# Patient Record
Sex: FEMALE | Race: WHITE | NOT HISPANIC OR LATINO | URBAN - METROPOLITAN AREA
[De-identification: names, ages, dates, MRNs, and addresses within clinical notes are randomized per-mention and may not be internally consistent; named-entity substitution may affect disease eponyms.]

---

## 2017-04-20 ENCOUNTER — OUTPATIENT (OUTPATIENT)
Dept: OUTPATIENT SERVICES | Facility: HOSPITAL | Age: 67
LOS: 1 days | End: 2017-04-20
Payer: MEDICARE

## 2017-04-20 ENCOUNTER — APPOINTMENT (OUTPATIENT)
Dept: MAMMOGRAPHY | Facility: HOSPITAL | Age: 67
End: 2017-04-20

## 2017-04-20 ENCOUNTER — APPOINTMENT (OUTPATIENT)
Dept: RADIOLOGY | Facility: HOSPITAL | Age: 67
End: 2017-04-20

## 2017-04-20 DIAGNOSIS — Z78.0 ASYMPTOMATIC MENOPAUSAL STATE: ICD-10-CM

## 2017-04-20 DIAGNOSIS — Z12.31 ENCOUNTER FOR SCREENING MAMMOGRAM FOR MALIGNANT NEOPLASM OF BREAST: ICD-10-CM

## 2017-04-20 PROCEDURE — 77080 DXA BONE DENSITY AXIAL: CPT

## 2017-04-20 PROCEDURE — 77063 BREAST TOMOSYNTHESIS BI: CPT

## 2017-04-20 PROCEDURE — 77067 SCR MAMMO BI INCL CAD: CPT

## 2018-05-29 ENCOUNTER — OUTPATIENT (OUTPATIENT)
Dept: OUTPATIENT SERVICES | Facility: HOSPITAL | Age: 68
LOS: 1 days | End: 2018-05-29
Payer: MEDICARE

## 2018-05-29 ENCOUNTER — APPOINTMENT (OUTPATIENT)
Dept: MAMMOGRAPHY | Facility: HOSPITAL | Age: 68
End: 2018-05-29
Payer: MEDICARE

## 2018-05-29 DIAGNOSIS — Z12.31 ENCOUNTER FOR SCREENING MAMMOGRAM FOR MALIGNANT NEOPLASM OF BREAST: ICD-10-CM

## 2018-05-29 DIAGNOSIS — Z00.8 ENCOUNTER FOR OTHER GENERAL EXAMINATION: ICD-10-CM

## 2018-05-29 PROCEDURE — 77067 SCR MAMMO BI INCL CAD: CPT

## 2018-05-29 PROCEDURE — 77063 BREAST TOMOSYNTHESIS BI: CPT

## 2018-05-29 PROCEDURE — 77067 SCR MAMMO BI INCL CAD: CPT | Mod: 26

## 2018-05-29 PROCEDURE — 77063 BREAST TOMOSYNTHESIS BI: CPT | Mod: 26

## 2019-10-29 ENCOUNTER — APPOINTMENT (OUTPATIENT)
Dept: MAMMOGRAPHY | Facility: HOSPITAL | Age: 69
End: 2019-10-29

## 2019-10-29 ENCOUNTER — OUTPATIENT (OUTPATIENT)
Dept: OUTPATIENT SERVICES | Facility: HOSPITAL | Age: 69
LOS: 1 days | End: 2019-10-29
Payer: MEDICARE

## 2019-10-29 DIAGNOSIS — Z12.31 ENCOUNTER FOR SCREENING MAMMOGRAM FOR MALIGNANT NEOPLASM OF BREAST: ICD-10-CM

## 2019-10-29 PROCEDURE — 77063 BREAST TOMOSYNTHESIS BI: CPT | Mod: 26

## 2019-10-29 PROCEDURE — 77067 SCR MAMMO BI INCL CAD: CPT | Mod: 26

## 2019-10-29 PROCEDURE — 77067 SCR MAMMO BI INCL CAD: CPT

## 2019-10-29 PROCEDURE — 77063 BREAST TOMOSYNTHESIS BI: CPT

## 2020-10-30 ENCOUNTER — APPOINTMENT (OUTPATIENT)
Dept: RADIOLOGY | Facility: HOSPITAL | Age: 70
End: 2020-10-30
Payer: MEDICARE

## 2020-10-30 ENCOUNTER — APPOINTMENT (OUTPATIENT)
Dept: MAMMOGRAPHY | Facility: HOSPITAL | Age: 70
End: 2020-10-30
Payer: MEDICARE

## 2020-10-30 ENCOUNTER — OUTPATIENT (OUTPATIENT)
Dept: OUTPATIENT SERVICES | Facility: HOSPITAL | Age: 70
LOS: 1 days | End: 2020-10-30
Payer: MEDICARE

## 2020-10-30 DIAGNOSIS — Z00.8 ENCOUNTER FOR OTHER GENERAL EXAMINATION: ICD-10-CM

## 2020-10-30 PROCEDURE — 77063 BREAST TOMOSYNTHESIS BI: CPT | Mod: 26

## 2020-10-30 PROCEDURE — 77067 SCR MAMMO BI INCL CAD: CPT

## 2020-10-30 PROCEDURE — 77080 DXA BONE DENSITY AXIAL: CPT | Mod: 26

## 2020-10-30 PROCEDURE — 77067 SCR MAMMO BI INCL CAD: CPT | Mod: 26

## 2020-10-30 PROCEDURE — 77080 DXA BONE DENSITY AXIAL: CPT

## 2020-10-30 PROCEDURE — 77063 BREAST TOMOSYNTHESIS BI: CPT

## 2021-06-07 ENCOUNTER — OUTPATIENT (OUTPATIENT)
Dept: OUTPATIENT SERVICES | Facility: HOSPITAL | Age: 71
LOS: 1 days | End: 2021-06-07
Payer: MEDICARE

## 2021-06-07 ENCOUNTER — APPOINTMENT (OUTPATIENT)
Dept: ULTRASOUND IMAGING | Facility: HOSPITAL | Age: 71
End: 2021-06-07
Payer: MEDICARE

## 2021-06-07 DIAGNOSIS — Z00.8 ENCOUNTER FOR OTHER GENERAL EXAMINATION: ICD-10-CM

## 2021-06-07 PROCEDURE — 76641 ULTRASOUND BREAST COMPLETE: CPT

## 2021-06-07 PROCEDURE — 76641 ULTRASOUND BREAST COMPLETE: CPT | Mod: 26,50

## 2021-06-21 ENCOUNTER — TRANSCRIPTION ENCOUNTER (OUTPATIENT)
Age: 71
End: 2021-06-21

## 2022-01-25 ENCOUNTER — NON-APPOINTMENT (OUTPATIENT)
Age: 72
End: 2022-01-25

## 2022-01-25 ENCOUNTER — APPOINTMENT (OUTPATIENT)
Dept: INTERNAL MEDICINE | Facility: CLINIC | Age: 72
End: 2022-01-25
Payer: MEDICARE

## 2022-01-25 VITALS
WEIGHT: 147 LBS | SYSTOLIC BLOOD PRESSURE: 130 MMHG | BODY MASS INDEX: 27.05 KG/M2 | HEART RATE: 76 BPM | RESPIRATION RATE: 16 BRPM | OXYGEN SATURATION: 98 % | TEMPERATURE: 97.5 F | HEIGHT: 62 IN | DIASTOLIC BLOOD PRESSURE: 74 MMHG

## 2022-01-25 DIAGNOSIS — Z86.79 PERSONAL HISTORY OF OTHER DISEASES OF THE CIRCULATORY SYSTEM: ICD-10-CM

## 2022-01-25 DIAGNOSIS — Z78.9 OTHER SPECIFIED HEALTH STATUS: ICD-10-CM

## 2022-01-25 DIAGNOSIS — L93.2 OTHER LOCAL LUPUS ERYTHEMATOSUS: ICD-10-CM

## 2022-01-25 PROCEDURE — 99204 OFFICE O/P NEW MOD 45 MIN: CPT

## 2022-01-25 RX ORDER — HYDROXYCHLOROQUINE SULFATE 200 MG/1
200 TABLET ORAL
Refills: 0 | Status: ACTIVE | COMMUNITY

## 2022-01-25 NOTE — HISTORY OF PRESENT ILLNESS
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  Dr. Stevens.\par History of HTN- stable on Meds.

## 2022-03-16 ENCOUNTER — EMERGENCY (EMERGENCY)
Facility: HOSPITAL | Age: 72
LOS: 1 days | Discharge: HOME | End: 2022-03-16
Attending: EMERGENCY MEDICINE | Admitting: EMERGENCY MEDICINE
Payer: MEDICARE

## 2022-03-16 VITALS
HEART RATE: 82 BPM | OXYGEN SATURATION: 99 % | RESPIRATION RATE: 18 BRPM | SYSTOLIC BLOOD PRESSURE: 133 MMHG | DIASTOLIC BLOOD PRESSURE: 81 MMHG

## 2022-03-16 VITALS
WEIGHT: 143.96 LBS | RESPIRATION RATE: 18 BRPM | SYSTOLIC BLOOD PRESSURE: 154 MMHG | OXYGEN SATURATION: 100 % | DIASTOLIC BLOOD PRESSURE: 85 MMHG | TEMPERATURE: 98 F | HEIGHT: 63 IN | HEART RATE: 89 BPM

## 2022-03-16 LAB
APPEARANCE UR: ABNORMAL
BILIRUB UR-MCNC: NEGATIVE — SIGNIFICANT CHANGE UP
COLOR SPEC: ABNORMAL
DIFF PNL FLD: ABNORMAL
GLUCOSE UR QL: NEGATIVE — SIGNIFICANT CHANGE UP
KETONES UR-MCNC: ABNORMAL
LEUKOCYTE ESTERASE UR-ACNC: ABNORMAL
NITRITE UR-MCNC: NEGATIVE — SIGNIFICANT CHANGE UP
PH UR: 6.5 — SIGNIFICANT CHANGE UP (ref 5–8)
PROT UR-MCNC: 100
SP GR SPEC: 1.01 — SIGNIFICANT CHANGE UP (ref 1.01–1.02)
UROBILINOGEN FLD QL: NEGATIVE — SIGNIFICANT CHANGE UP

## 2022-03-16 PROCEDURE — 99283 EMERGENCY DEPT VISIT LOW MDM: CPT

## 2022-03-16 PROCEDURE — 87086 URINE CULTURE/COLONY COUNT: CPT

## 2022-03-16 PROCEDURE — 87186 SC STD MICRODIL/AGAR DIL: CPT

## 2022-03-16 PROCEDURE — 81001 URINALYSIS AUTO W/SCOPE: CPT

## 2022-03-16 RX ORDER — PHENAZOPYRIDINE HCL 100 MG
100 TABLET ORAL ONCE
Refills: 0 | Status: COMPLETED | OUTPATIENT
Start: 2022-03-16 | End: 2022-03-16

## 2022-03-16 RX ORDER — PHENAZOPYRIDINE HCL 100 MG
1 TABLET ORAL
Qty: 6 | Refills: 0
Start: 2022-03-16 | End: 2022-03-17

## 2022-03-16 RX ORDER — CEFUROXIME AXETIL 250 MG
1 TABLET ORAL
Qty: 10 | Refills: 0
Start: 2022-03-16 | End: 2022-03-20

## 2022-03-16 RX ORDER — CEFUROXIME AXETIL 250 MG
500 TABLET ORAL ONCE
Refills: 0 | Status: COMPLETED | OUTPATIENT
Start: 2022-03-16 | End: 2022-03-16

## 2022-03-16 RX ADMIN — Medication 500 MILLIGRAM(S): at 01:09

## 2022-03-16 RX ADMIN — Medication 100 MILLIGRAM(S): at 01:09

## 2022-03-16 NOTE — ED PROVIDER NOTE - OBJECTIVE STATEMENT
pt c/o dysuria, moderate, x last 3 hrs assoc c hematuria and urinary frequency. no abd or flank pain. no fever/chills.

## 2022-03-16 NOTE — ED PROVIDER NOTE - NSFOLLOWUPINSTRUCTIONS_ED_ALL_ED_FT
- take cefuroxime antibiotic as directed  - take phenazopyridine for urinary pain. it will temporarily turn body fluids red/orange color    Follow Up in 1-3 Days with your own doctor or with  [address] [address]  Phone: [phone]      Urinary Tract Infection in Women    WHAT YOU NEED TO KNOW:    A urinary tract infection (UTI) is caused by bacteria that get inside your urinary tract. Most bacteria that enter your urinary tract come out when you urinate. If the bacteria stay in your urinary tract, you may get an infection. Your urinary tract includes your kidneys, ureters, bladder, and urethra. Urine is made in your kidneys, and it flows from the ureters to the bladder. Urine leaves the bladder through the urethra. A UTI is more common in your lower urinary tract, which includes your bladder and urethra. Female Urinary System         DISCHARGE INSTRUCTIONS:    Return to the emergency department if:     You are urinating very little or not at all.    You have a high fever with shaking chills.     You have side or back pain that gets worse.    Call your doctor if:     You have a fever.    You do not feel better after 2 days of taking antibiotics.    You are vomiting.     You have questions or concerns about your condition or care.    Medicines:     Antibiotics help fight a bacterial infection. If you have UTIs often (called recurrent UTIs), you may be given antibiotics to take regularly. You will be given directions for when and how to use antibiotics. The goal is to prevent UTIs but not cause antibiotic resistance by using antibiotics too often.    Medicines may be given to decrease pain and burning when you urinate. They will also help decrease the feeling that you need to urinate often. These medicines will make your urine orange or red.    Take your medicine as directed. Contact your healthcare provider if you think your medicine is not helping or if you have side effects. Tell him or her if you are allergic to any medicine. Keep a list of the medicines, vitamins, and herbs you take. Include the amounts, and when and why you take them. Bring the list or the pill bottles to follow-up visits. Carry your medicine list with you in case of an emergency.    Follow up with your healthcare provider as directed: Write down your questions so you remember to ask them during your visits.     Prevent another UTI:     Empty your bladder often. Urinate and empty your bladder as soon as you feel the need. Do not hold your urine for long periods of time.    Wipe from front to back after you urinate or have a bowel movement. This will help prevent germs from getting into your urinary tract through your urethra.    Drink liquids as directed. Ask how much liquid to drink each day and which liquids are best for you. You may need to drink more liquids than usual to help flush out the bacteria. Do not drink alcohol, caffeine, or citrus juices. These can irritate your bladder and increase your symptoms. Your healthcare provider may recommend cranberry juice to help prevent a UTI.    Urinate after you have sex. This can help flush out bacteria passed during sex.    Do not douche or use feminine deodorants. These can change the chemical balance in your vagina.      Change sanitary pads or tampons often. This will help prevent germs from getting into your urinary tract.     Talk to your healthcare provider about your birth control method. You may need to change your method if it is increasing your risk for UTIs.    Wear cotton underwear and clothes that are loose. Tight pants and nylon underwear can trap moisture and cause bacteria to grow.    Vaginal estrogen may be recommended. This medicine helps prevent UTIs in women who have gone through menopause or are in oni-menopause.    Do pelvic muscle exercises often. Pelvic muscle exercises may help you start and stop urinating. Strong pelvic muscles may help you empty your bladder easier. Squeeze these muscles tightly for 5 seconds like you are trying to hold back urine. Then relax for 5 seconds. Gradually work up to squeezing for 10 seconds. Do 3 sets of 15 repetitions a day, or as directed.
normal...

## 2022-03-16 NOTE — ED PROVIDER NOTE - NSFOLLOWUPCLINICS_GEN_ALL_ED_FT
[facility]  Family Medicine  [address] [place] [id number]  Phone: [phone]  Fax:   Follow Up Time: 1-3 Days

## 2022-03-16 NOTE — ED PROVIDER NOTE - CLINICAL SUMMARY MEDICAL DECISION MAKING FREE TEXT BOX
pt c dysuria, frequency and hematuria tonight. c/w uti. otherwise well appearing . no f/c. no abd/flank pain to suggest upper uti.  will send ua/ucx. treat c ceftin/pyridium. pmd fu

## 2022-03-16 NOTE — ED ADULT TRIAGE NOTE - CHIEF COMPLAINT QUOTE
Pt states "most severe UTI I've ever had." Pt reports hematuria, urinary frequency, painful urination 8/10 with burning. Denies fever, chills, chest pain, shortness of breath, N/V/D. Pt states she has a hx of UTIs, last UTI was 10/2021.

## 2022-03-16 NOTE — ED PROVIDER NOTE - PATIENT PORTAL LINK FT
You can access the FollowMyHealth Patient Portal offered by [facility] by registering at the following website: [email or web address]. By joining [employer]’s FollowMyHealth portal, you will also be able to view your health information using other applications (apps) compatible with our system.

## 2022-03-19 NOTE — CHART NOTE - NSCHARTNOTEFT_GEN_A_CORE
SW placed call to patient to discuss and assist with follow up care.  Patient presented to ED on 3/15 with complaints of UTI. SW spoke with Pt who states she is feeling better. Pt states she did not yet schedule PCP follow-up, but does have a previously scheduled appointment for 6 weeks from now. SW offered to schedule earlier F/U appointment, Pt declined stating she has a busy schedule and would prefer to schedule independently. Pt declined further assistance. SW encouraged Pt to call should further needs arise.

## 2022-04-15 ENCOUNTER — NON-APPOINTMENT (OUTPATIENT)
Age: 72
End: 2022-04-15

## 2022-06-09 ENCOUNTER — APPOINTMENT (OUTPATIENT)
Dept: INTERNAL MEDICINE | Facility: CLINIC | Age: 72
End: 2022-06-09
Payer: MEDICARE

## 2022-06-09 ENCOUNTER — NON-APPOINTMENT (OUTPATIENT)
Age: 72
End: 2022-06-09

## 2022-06-09 VITALS
TEMPERATURE: 97.3 F | SYSTOLIC BLOOD PRESSURE: 138 MMHG | OXYGEN SATURATION: 97 % | DIASTOLIC BLOOD PRESSURE: 76 MMHG | BODY MASS INDEX: 27.11 KG/M2 | HEART RATE: 69 BPM | WEIGHT: 153 LBS | HEIGHT: 63 IN | RESPIRATION RATE: 16 BRPM

## 2022-06-09 VITALS
HEIGHT: 63 IN | OXYGEN SATURATION: 99 % | TEMPERATURE: 97.7 F | SYSTOLIC BLOOD PRESSURE: 138 MMHG | DIASTOLIC BLOOD PRESSURE: 76 MMHG | BODY MASS INDEX: 27.11 KG/M2 | WEIGHT: 153 LBS | RESPIRATION RATE: 16 BRPM | HEART RATE: 86 BPM

## 2022-06-09 DIAGNOSIS — L93.2 OTHER LOCAL LUPUS ERYTHEMATOSUS: ICD-10-CM

## 2022-06-09 DIAGNOSIS — R92.2 INCONCLUSIVE MAMMOGRAM: ICD-10-CM

## 2022-06-09 DIAGNOSIS — B35.9 DERMATOPHYTOSIS, UNSPECIFIED: ICD-10-CM

## 2022-06-09 PROBLEM — I10 ESSENTIAL (PRIMARY) HYPERTENSION: Chronic | Status: ACTIVE | Noted: 2022-03-16

## 2022-06-09 PROBLEM — M32.9 SYSTEMIC LUPUS ERYTHEMATOSUS, UNSPECIFIED: Chronic | Status: ACTIVE | Noted: 2022-03-16

## 2022-06-09 PROCEDURE — 93000 ELECTROCARDIOGRAM COMPLETE: CPT

## 2022-06-09 PROCEDURE — G0439: CPT

## 2022-06-09 RX ORDER — KETOCONAZOLE 20.5 MG/ML
2 SHAMPOO, SUSPENSION TOPICAL
Qty: 120 | Refills: 1 | Status: ACTIVE | COMMUNITY
Start: 2022-06-09 | End: 1900-01-01

## 2022-06-09 NOTE — HISTORY OF PRESENT ILLNESS
[de-identified] : Pt here today for CPE\par History of cutaneous lupus- followed by rheum . Dr. Stevens. \par History of htn- stable on meds

## 2022-06-09 NOTE — ASSESSMENT
[FreeTextEntry1] : HTN- good control\par Cutaneous lupus- follow up with rheum\par Tinea in back - ketoconazole shampoo TIW to derm.\par HCM- needs gyn, colon. mammogram and derm visit\par check labs and ekg today

## 2022-06-09 NOTE — PHYSICAL EXAM
[No Acute Distress] : no acute distress [Well Nourished] : well nourished [Well Developed] : well developed [Well-Appearing] : well-appearing [Normal Sclera/Conjunctiva] : normal sclera/conjunctiva [PERRL] : pupils equal round and reactive to light [EOMI] : extraocular movements intact [Normal Outer Ear/Nose] : the outer ears and nose were normal in appearance [Normal Oropharynx] : the oropharynx was normal [No JVD] : no jugular venous distention [No Lymphadenopathy] : no lymphadenopathy [Supple] : supple [Thyroid Normal, No Nodules] : the thyroid was normal and there were no nodules present [No Respiratory Distress] : no respiratory distress  [No Accessory Muscle Use] : no accessory muscle use [Clear to Auscultation] : lungs were clear to auscultation bilaterally [Normal Rate] : normal rate  [Regular Rhythm] : with a regular rhythm [Normal S1, S2] : normal S1 and S2 [No Murmur] : no murmur heard [No Carotid Bruits] : no carotid bruits [No Abdominal Bruit] : a ~M bruit was not heard ~T in the abdomen [No Varicosities] : no varicosities [Pedal Pulses Present] : the pedal pulses are present [No Edema] : there was no peripheral edema [No Palpable Aorta] : no palpable aorta [No Extremity Clubbing/Cyanosis] : no extremity clubbing/cyanosis [Soft] : abdomen soft [Non Tender] : non-tender [Non-distended] : non-distended [No Masses] : no abdominal mass palpated [No HSM] : no HSM [Normal Bowel Sounds] : normal bowel sounds [Normal Posterior Cervical Nodes] : no posterior cervical lymphadenopathy [Normal Anterior Cervical Nodes] : no anterior cervical lymphadenopathy [No CVA Tenderness] : no CVA  tenderness [No Spinal Tenderness] : no spinal tenderness [No Joint Swelling] : no joint swelling [Grossly Normal Strength/Tone] : grossly normal strength/tone [No Rash] : no rash [Coordination Grossly Intact] : coordination grossly intact [No Focal Deficits] : no focal deficits [Normal Gait] : normal gait [Deep Tendon Reflexes (DTR)] : deep tendon reflexes were 2+ and symmetric [Normal Affect] : the affect was normal [Normal Insight/Judgement] : insight and judgment were intact [de-identified] : white patches in the back

## 2022-06-10 LAB
25(OH)D3 SERPL-MCNC: 38.5 NG/ML
ALBUMIN SERPL ELPH-MCNC: 4.7 G/DL
ALP BLD-CCNC: 55 U/L
ALT SERPL-CCNC: 21 U/L
ANION GAP SERPL CALC-SCNC: 13 MMOL/L
APPEARANCE: CLEAR
AST SERPL-CCNC: 25 U/L
BACTERIA: NEGATIVE
BASOPHILS # BLD AUTO: 0.05 K/UL
BASOPHILS NFR BLD AUTO: 0.7 %
BILIRUB SERPL-MCNC: 0.4 MG/DL
BILIRUBIN URINE: NEGATIVE
BLOOD URINE: NEGATIVE
BUN SERPL-MCNC: 16 MG/DL
CALCIUM SERPL-MCNC: 9.5 MG/DL
CHLORIDE SERPL-SCNC: 103 MMOL/L
CHOLEST SERPL-MCNC: 230 MG/DL
CO2 SERPL-SCNC: 25 MMOL/L
COLOR: NORMAL
CREAT SERPL-MCNC: 0.69 MG/DL
EGFR: 93 ML/MIN/1.73M2
EOSINOPHIL # BLD AUTO: 0.15 K/UL
EOSINOPHIL NFR BLD AUTO: 2.1 %
ESTIMATED AVERAGE GLUCOSE: 105 MG/DL
FOLATE SERPL-MCNC: >20 NG/ML
GLUCOSE QUALITATIVE U: NEGATIVE
GLUCOSE SERPL-MCNC: 84 MG/DL
HBA1C MFR BLD HPLC: 5.3 %
HCT VFR BLD CALC: 41.9 %
HDLC SERPL-MCNC: 104 MG/DL
HGB BLD-MCNC: 13.8 G/DL
HYALINE CASTS: 1 /LPF
IMM GRANULOCYTES NFR BLD AUTO: 0.3 %
KETONES URINE: NEGATIVE
LDLC SERPL CALC-MCNC: 117 MG/DL
LEUKOCYTE ESTERASE URINE: ABNORMAL
LYMPHOCYTES # BLD AUTO: 1.27 K/UL
LYMPHOCYTES NFR BLD AUTO: 17.8 %
MAN DIFF?: NORMAL
MCHC RBC-ENTMCNC: 30.7 PG
MCHC RBC-ENTMCNC: 32.9 GM/DL
MCV RBC AUTO: 93.3 FL
MICROSCOPIC-UA: NORMAL
MONOCYTES # BLD AUTO: 0.56 K/UL
MONOCYTES NFR BLD AUTO: 7.8 %
NEUTROPHILS # BLD AUTO: 5.1 K/UL
NEUTROPHILS NFR BLD AUTO: 71.3 %
NITRITE URINE: NEGATIVE
NONHDLC SERPL-MCNC: 126 MG/DL
PH URINE: 6.5
PLATELET # BLD AUTO: 223 K/UL
POTASSIUM SERPL-SCNC: 4.3 MMOL/L
PROT SERPL-MCNC: 7 G/DL
PROTEIN URINE: NEGATIVE
RBC # BLD: 4.49 M/UL
RBC # FLD: 13.4 %
RED BLOOD CELLS URINE: 0 /HPF
SODIUM SERPL-SCNC: 141 MMOL/L
SPECIFIC GRAVITY URINE: 1.01
SQUAMOUS EPITHELIAL CELLS: 5 /HPF
TRIGL SERPL-MCNC: 41 MG/DL
TSH SERPL-ACNC: 1.83 UIU/ML
UROBILINOGEN URINE: NORMAL
VIT B12 SERPL-MCNC: 447 PG/ML
WBC # FLD AUTO: 7.15 K/UL
WHITE BLOOD CELLS URINE: 7 /HPF

## 2022-06-15 NOTE — ED ADULT NURSE NOTE - CAS TRG GEN SKIN CONDITION
Patient contacted regarding recent visit for viral symptoms. Call within 2 business days of discharge: Yes    Medical Assistant contacted the patient by telephone to perform follow-up call. Verified name and  with patient as identifiers. Provided introduction to self, and reason for call due to viral symptoms of infection and/or exposure to COVID-19. Discussed COVID-19 related testing which was available at this time. Test results were positive. Patient informed of results, if available? Yes. Patient presented to emergency department/flu clinic with complaints of viral symptoms/exposure to COVID. Patient reports symptoms are the same. Due to no new or worsening symptoms the RN CTN/ACM was not notified for escalation. This author reviewed discharge instructions, medical action plan and red flags such as increased shortness of breath, increasing fever, worsening cough or chest pain with patient who verbalized understanding. Discussed exposure protocols and quarantine with CDC Guidelines What To Do If You Are Sick    Patient was given an opportunity for questions and concerns. The patient agrees to contact their healthcare provider for questions related to their healthcare. Author provided contact information for future reference. Pt advised to  prednisone and be sure to complete it. Pt advised to follow up with PCP within 7 days if not feeling better. Warm

## 2022-06-30 LAB — HEMOCCULT STL QL IA: NEGATIVE

## 2022-09-22 ENCOUNTER — APPOINTMENT (OUTPATIENT)
Dept: INTERNAL MEDICINE | Facility: CLINIC | Age: 72
End: 2022-09-22

## 2022-09-22 VITALS
HEART RATE: 62 BPM | TEMPERATURE: 97.1 F | SYSTOLIC BLOOD PRESSURE: 136 MMHG | DIASTOLIC BLOOD PRESSURE: 74 MMHG | WEIGHT: 162 LBS | RESPIRATION RATE: 16 BRPM | OXYGEN SATURATION: 98 % | BODY MASS INDEX: 28.7 KG/M2 | HEIGHT: 63 IN

## 2022-09-22 DIAGNOSIS — E55.9 VITAMIN D DEFICIENCY, UNSPECIFIED: ICD-10-CM

## 2022-09-22 PROCEDURE — 99214 OFFICE O/P EST MOD 30 MIN: CPT

## 2022-09-22 NOTE — ASSESSMENT
[FreeTextEntry1] : HTN- good control- renew meds\par Vit D def- continue vit daily\par SLE- follow up with rheumatology- Rodney

## 2022-11-14 ENCOUNTER — NON-APPOINTMENT (OUTPATIENT)
Age: 72
End: 2022-11-14

## 2022-11-15 ENCOUNTER — OUTPATIENT (OUTPATIENT)
Dept: OUTPATIENT SERVICES | Facility: HOSPITAL | Age: 72
LOS: 1 days | End: 2022-11-15
Payer: MEDICARE

## 2022-11-15 ENCOUNTER — RESULT REVIEW (OUTPATIENT)
Age: 72
End: 2022-11-15

## 2022-11-15 ENCOUNTER — APPOINTMENT (OUTPATIENT)
Dept: MAMMOGRAPHY | Facility: HOSPITAL | Age: 72
End: 2022-11-15

## 2022-11-15 ENCOUNTER — APPOINTMENT (OUTPATIENT)
Dept: ULTRASOUND IMAGING | Facility: HOSPITAL | Age: 72
End: 2022-11-15

## 2022-11-15 DIAGNOSIS — Z00.8 ENCOUNTER FOR OTHER GENERAL EXAMINATION: ICD-10-CM

## 2022-11-15 PROCEDURE — 77067 SCR MAMMO BI INCL CAD: CPT | Mod: 26

## 2022-11-15 PROCEDURE — 77063 BREAST TOMOSYNTHESIS BI: CPT | Mod: 26

## 2022-11-15 PROCEDURE — 77067 SCR MAMMO BI INCL CAD: CPT

## 2022-11-15 PROCEDURE — 76641 ULTRASOUND BREAST COMPLETE: CPT

## 2022-11-15 PROCEDURE — 76641 ULTRASOUND BREAST COMPLETE: CPT | Mod: 26,50

## 2022-11-15 PROCEDURE — 77063 BREAST TOMOSYNTHESIS BI: CPT

## 2022-11-16 ENCOUNTER — APPOINTMENT (OUTPATIENT)
Dept: PULMONOLOGY | Facility: CLINIC | Age: 72
End: 2022-11-16

## 2022-11-16 VITALS — BODY MASS INDEX: 29.23 KG/M2 | WEIGHT: 165 LBS | HEIGHT: 63 IN

## 2022-11-16 VITALS — HEART RATE: 69 BPM | OXYGEN SATURATION: 99 % | SYSTOLIC BLOOD PRESSURE: 140 MMHG | DIASTOLIC BLOOD PRESSURE: 83 MMHG

## 2022-11-16 PROCEDURE — 99204 OFFICE O/P NEW MOD 45 MIN: CPT

## 2022-11-16 NOTE — HISTORY OF PRESENT ILLNESS
[Never] : never [TextBox_4] : EDIN MCGOWAN is a 72 year old female who presents with snoring complaints\par has HTN and SLE- on immunosuppressants, sees rheum at [facility] Dr Stevens\par \par PCP: Dr Hurst\par \par \par SLEEP: \par bedtime around 11pm. no difficulty falling back asleep\par final wake up is around 8am\par 3 cups of coffee morning\par rare tea in the evenings/late afternoon-decaf tea\par no sleep aides\par \par + snoring. sometimes wakes herself up overnight. no apneas.  some dream recall. no morning headaches. + dry mouth.\par wakes up for nocturia.  no parasomnias. denies clench jaw. no driving when fatigue\par rare short nap before 2-3pm\par \par \par

## 2022-11-16 NOTE — PHYSICAL EXAM
[No Acute Distress] : no acute distress [Well Nourished] : well nourished [Well Developed] : well developed [Low Lying Soft Palate] : low lying soft palate [IV] : Mallampati Class: IV [No Resp Distress] : no resp distress [No Acc Muscle Use] : no acc muscle use [Clear to Auscultation Bilaterally] : clear to auscultation bilaterally [Oriented x3] : oriented x3

## 2022-12-07 ENCOUNTER — APPOINTMENT (OUTPATIENT)
Dept: OBGYN | Facility: CLINIC | Age: 72
End: 2022-12-07
Payer: MEDICARE

## 2023-01-09 ENCOUNTER — APPOINTMENT (OUTPATIENT)
Dept: PULMONOLOGY | Facility: CLINIC | Age: 73
End: 2023-01-09
Payer: MEDICARE

## 2023-01-09 PROCEDURE — 95800 SLP STDY UNATTENDED: CPT

## 2023-02-01 ENCOUNTER — APPOINTMENT (OUTPATIENT)
Dept: PULMONOLOGY | Facility: CLINIC | Age: 73
End: 2023-02-01
Payer: MEDICARE

## 2023-02-01 VITALS — HEART RATE: 69 BPM | SYSTOLIC BLOOD PRESSURE: 145 MMHG | DIASTOLIC BLOOD PRESSURE: 77 MMHG | OXYGEN SATURATION: 97 %

## 2023-02-01 DIAGNOSIS — R06.83 SNORING: ICD-10-CM

## 2023-02-01 PROCEDURE — 99213 OFFICE O/P EST LOW 20 MIN: CPT

## 2023-02-01 NOTE — HISTORY OF PRESENT ILLNESS
[Never] : never [TextBox_4] : EDIN MCGOWAN is a 72 year old female who presents for f/u on HST results\par no other changes\par

## 2023-02-10 ENCOUNTER — APPOINTMENT (OUTPATIENT)
Dept: OBGYN | Facility: CLINIC | Age: 73
End: 2023-02-10
Payer: MEDICARE

## 2023-02-10 VITALS
BODY MASS INDEX: 31.54 KG/M2 | HEIGHT: 63 IN | DIASTOLIC BLOOD PRESSURE: 75 MMHG | HEART RATE: 73 BPM | SYSTOLIC BLOOD PRESSURE: 145 MMHG | WEIGHT: 178 LBS

## 2023-02-10 DIAGNOSIS — Z82.49 FAMILY HISTORY OF ISCHEMIC HEART DISEASE AND OTHER DISEASES OF THE CIRCULATORY SYSTEM: ICD-10-CM

## 2023-02-10 DIAGNOSIS — Z01.419 ENCOUNTER FOR GYNECOLOGICAL EXAMINATION (GENERAL) (ROUTINE) W/OUT ABNORMAL FINDINGS: ICD-10-CM

## 2023-02-10 PROCEDURE — G0101: CPT

## 2023-02-10 NOTE — HISTORY OF PRESENT ILLNESS
[Mammogramdate] : 2022 [BreastSonogramDate] : 2022 [PapSmeardate] : emr [BoneDensityDate] : emr [ColonoscopyDate] : 2014

## 2023-02-12 LAB — HPV HIGH+LOW RISK DNA PNL CVX: NOT DETECTED

## 2023-02-14 LAB — CYTOLOGY CVX/VAG DOC THIN PREP: ABNORMAL

## 2023-02-15 DIAGNOSIS — Z86.69 PERSONAL HISTORY OF OTHER DISEASES OF THE NERVOUS SYSTEM AND SENSE ORGANS: ICD-10-CM

## 2023-02-20 ENCOUNTER — NON-APPOINTMENT (OUTPATIENT)
Age: 73
End: 2023-02-20

## 2023-02-22 DIAGNOSIS — G47.33 OBSTRUCTIVE SLEEP APNEA (ADULT) (PEDIATRIC): ICD-10-CM

## 2023-02-23 ENCOUNTER — RX RENEWAL (OUTPATIENT)
Age: 73
End: 2023-02-23

## 2023-03-03 ENCOUNTER — APPOINTMENT (OUTPATIENT)
Dept: ORTHOPEDIC SURGERY | Facility: CLINIC | Age: 73
End: 2023-03-03
Payer: MEDICARE

## 2023-03-03 VITALS — BODY MASS INDEX: 31.54 KG/M2 | HEIGHT: 63 IN | WEIGHT: 178 LBS

## 2023-03-03 DIAGNOSIS — M65.342 TRIGGER FINGER, LEFT RING FINGER: ICD-10-CM

## 2023-03-03 PROCEDURE — J3490M: CUSTOM | Mod: NC

## 2023-03-03 PROCEDURE — 99213 OFFICE O/P EST LOW 20 MIN: CPT | Mod: 25

## 2023-03-03 PROCEDURE — 20550 NJX 1 TENDON SHEATH/LIGAMENT: CPT | Mod: LT

## 2023-03-03 NOTE — PHYSICAL EXAM
[de-identified] : L hand: \par Mild swelling \par Tender 4th A1 pulley \par Decreased ring ROM \par +ring triggering\par

## 2023-03-03 NOTE — HISTORY OF PRESENT ILLNESS
[5] : 5 [3] : 3 [Dull/Aching] : dull/aching [Nothing helps with pain getting better] : Nothing helps with pain getting better [de-identified] : L RF trigger [] : no [FreeTextEntry1] : left hand/ RF  [FreeTextEntry3] : a month ago  [FreeTextEntry5] : she has pain, swelling, weakness  and right RF is locking   [de-identified] : activity

## 2023-03-03 NOTE — ASSESSMENT
[FreeTextEntry1] : L RF Trigger finger tendon sheath injection was performed because of pain inflammation and stiffness\par Anesthesia: ethyl chloride sprayed topically\par Celestone: An injection of Celestone 1cc\par Lidocaine: An injection of Lidocaine 1% 1cc\par Marcaine: An injection of Marcaine 0.5% 1cc\par \par Patient has tried OTC's including aspirin, Ibuprofen, Aleve etc or prescription NSAIDS, and/or exercises at home and/ or\par physical therapy without satisfactory response.\par After verbal consent using sterile preparation and technique. The risks, benefits, and alternatives to cortisone injection\par were explained in full to the patient. Risks outlined include but are not limited to infection, sepsis, bleeding, scarring, skin\par discoloration, temporary increase in pain, syncopal episode, failure to resolve symptoms, allergic reaction, symptom\par recurrence, and elevation of blood sugar in diabetics. Patient understood the risks. All questions were answered. After\par discussion of options, patient requested an injection. Oral informed consent was obtained and sterile prep was done of the\par injection site. Sterile technique was utilized for the procedure including the preparation of the solutions used for the\par injection. Patient tolerated the procedure well. Advised to ice the injection site this evening.\par Prep with betadine locally to site. Sterile technique used

## 2023-03-14 ENCOUNTER — APPOINTMENT (OUTPATIENT)
Dept: INTERNAL MEDICINE | Facility: CLINIC | Age: 73
End: 2023-03-14
Payer: MEDICARE

## 2023-03-14 VITALS
DIASTOLIC BLOOD PRESSURE: 62 MMHG | HEART RATE: 70 BPM | SYSTOLIC BLOOD PRESSURE: 130 MMHG | BODY MASS INDEX: 31.54 KG/M2 | WEIGHT: 178 LBS | RESPIRATION RATE: 14 BRPM | HEIGHT: 63 IN

## 2023-03-14 DIAGNOSIS — M54.9 DORSALGIA, UNSPECIFIED: ICD-10-CM

## 2023-03-14 PROCEDURE — 99214 OFFICE O/P EST MOD 30 MIN: CPT

## 2023-03-14 NOTE — PHYSICAL EXAM
[Normal] : normal gait, coordination grossly intact, no focal deficits and deep tendon reflexes were 2+ and symmetric [de-identified] : paraspinal muscle tend on left side

## 2023-03-14 NOTE — ASSESSMENT
[FreeTextEntry1] : HTN- good control.\par Back pain- to PT for evaluation and treatment\par To get CPE in 6/23

## 2023-03-14 NOTE — HISTORY OF PRESENT ILLNESS
[de-identified] : Blood pressure at home 130/60.\par Complains of back pain for past 3 months. No radiation down leg. No numbness no weakness\par

## 2023-05-14 ENCOUNTER — NON-APPOINTMENT (OUTPATIENT)
Age: 73
End: 2023-05-14

## 2023-05-22 ENCOUNTER — RX RENEWAL (OUTPATIENT)
Age: 73
End: 2023-05-22

## 2023-05-26 ENCOUNTER — TRANSCRIPTION ENCOUNTER (OUTPATIENT)
Age: 73
End: 2023-05-26

## 2023-05-26 DIAGNOSIS — M25.559 PAIN IN UNSPECIFIED HIP: ICD-10-CM

## 2023-06-01 ENCOUNTER — APPOINTMENT (OUTPATIENT)
Dept: PULMONOLOGY | Facility: CLINIC | Age: 73
End: 2023-06-01
Payer: MEDICARE

## 2023-06-01 DIAGNOSIS — G47.33 OBSTRUCTIVE SLEEP APNEA (ADULT) (PEDIATRIC): ICD-10-CM

## 2023-06-01 PROCEDURE — 99213 OFFICE O/P EST LOW 20 MIN: CPT | Mod: 95

## 2023-06-01 NOTE — HISTORY OF PRESENT ILLNESS
[TextBox_4] : This visit was provided via telehealth using real-time 2-way audio visual technology. The patient,  EDIN MCGOWAN , was located at home, [address]\[place] [address] at the time of the visit. \par The provider, Sharmin Friend, was located at office at the time of the visit. \par The patient, Ms. EDIN MCGOWAN and Physician MD Friend, Sharmin participated in the telehealth encounter. \par Verbal consent obtained by  from patient \par \par EDIN MCGOWAN is a 72 year old female who presents to f/u on SALUD\par HST done jan 2023- moderate salud\par she saw dental\par has mandibular advancement device\par sanket ruth\par

## 2023-06-07 ENCOUNTER — NON-APPOINTMENT (OUTPATIENT)
Age: 73
End: 2023-06-07

## 2023-07-06 ENCOUNTER — APPOINTMENT (OUTPATIENT)
Dept: PULMONOLOGY | Facility: CLINIC | Age: 73
End: 2023-07-06
Payer: MEDICARE

## 2023-07-07 ENCOUNTER — FORM ENCOUNTER (OUTPATIENT)
Age: 73
End: 2023-07-07

## 2023-07-08 PROCEDURE — 95800 SLP STDY UNATTENDED: CPT

## 2023-07-10 PROCEDURE — 95800 SLP STDY UNATTENDED: CPT

## 2023-07-18 ENCOUNTER — NON-APPOINTMENT (OUTPATIENT)
Age: 73
End: 2023-07-18

## 2023-07-19 ENCOUNTER — APPOINTMENT (OUTPATIENT)
Dept: INTERNAL MEDICINE | Facility: CLINIC | Age: 73
End: 2023-07-19
Payer: MEDICARE

## 2023-07-19 VITALS
SYSTOLIC BLOOD PRESSURE: 136 MMHG | HEART RATE: 67 BPM | RESPIRATION RATE: 14 BRPM | OXYGEN SATURATION: 98 % | BODY MASS INDEX: 31.36 KG/M2 | DIASTOLIC BLOOD PRESSURE: 74 MMHG | HEIGHT: 63 IN | WEIGHT: 177 LBS | TEMPERATURE: 98.2 F

## 2023-07-19 DIAGNOSIS — Z00.00 ENCOUNTER FOR GENERAL ADULT MEDICAL EXAMINATION W/OUT ABNORMAL FINDINGS: ICD-10-CM

## 2023-07-19 DIAGNOSIS — I10 ESSENTIAL (PRIMARY) HYPERTENSION: ICD-10-CM

## 2023-07-19 PROCEDURE — G0439: CPT

## 2023-07-19 NOTE — HEALTH RISK ASSESSMENT
[Very Good] : ~his/her~  mood as very good [No falls in past year] : Patient reported no falls in the past year [0] : 2) Feeling down, depressed, or hopeless: Not at all (0) [PHQ-2 Negative - No further assessment needed] : PHQ-2 Negative - No further assessment needed [[id number]] : 0 [Patient reported mammogram was normal] : Patient reported mammogram was normal [MammogramDate] : 11/22

## 2023-07-19 NOTE — ASSESSMENT
[FreeTextEntry1] : HTN- had EKG by cardiology Dr. Eubanks - [facility] for CT of chest and echo 9/23\par Blood pressure good control\par check labs.\par Needs records of PNA shot

## 2023-07-19 NOTE — HISTORY OF PRESENT ILLNESS
[de-identified] : History of cutaneous lupus- followed by dr. Stevens.\par History of HTN- stable on meds.\par

## 2023-07-20 LAB
25(OH)D3 SERPL-MCNC: 37.2 NG/ML
ALBUMIN SERPL ELPH-MCNC: 4.7 G/DL
ALP BLD-CCNC: 55 U/L
ALT SERPL-CCNC: 18 U/L
ANION GAP SERPL CALC-SCNC: 11 MMOL/L
APPEARANCE: CLEAR
AST SERPL-CCNC: 24 U/L
BACTERIA: NEGATIVE /HPF
BILIRUB SERPL-MCNC: 0.4 MG/DL
BILIRUBIN URINE: NEGATIVE
BLOOD URINE: NEGATIVE
BUN SERPL-MCNC: 17 MG/DL
CALCIUM SERPL-MCNC: 9.6 MG/DL
CAST: 0 /LPF
CHLORIDE SERPL-SCNC: 104 MMOL/L
CHOLEST SERPL-MCNC: 217 MG/DL
CO2 SERPL-SCNC: 26 MMOL/L
COLOR: YELLOW
CREAT SERPL-MCNC: 0.74 MG/DL
EGFR: 86 ML/MIN/1.73M2
EPITHELIAL CELLS: 0 /HPF
ESTIMATED AVERAGE GLUCOSE: 114 MG/DL
FOLATE SERPL-MCNC: 14.8 NG/ML
GLUCOSE QUALITATIVE U: NEGATIVE MG/DL
GLUCOSE SERPL-MCNC: 90 MG/DL
HBA1C MFR BLD HPLC: 5.6 %
HDLC SERPL-MCNC: 84 MG/DL
KETONES URINE: NEGATIVE MG/DL
LDLC SERPL CALC-MCNC: 124 MG/DL
LEUKOCYTE ESTERASE URINE: NEGATIVE
MICROSCOPIC-UA: NORMAL
NITRITE URINE: NEGATIVE
NONHDLC SERPL-MCNC: 133 MG/DL
PH URINE: 7
POTASSIUM SERPL-SCNC: 4.5 MMOL/L
PROT SERPL-MCNC: 6.8 G/DL
PROTEIN URINE: NEGATIVE MG/DL
RED BLOOD CELLS URINE: 0 /HPF
REVIEW: NORMAL
SODIUM SERPL-SCNC: 140 MMOL/L
SPECIFIC GRAVITY URINE: 1.01
TRIGL SERPL-MCNC: 56 MG/DL
TSH SERPL-ACNC: 3.2 UIU/ML
UROBILINOGEN URINE: 0.2 MG/DL
VIT B12 SERPL-MCNC: 415 PG/ML
WHITE BLOOD CELLS URINE: 0 /HPF

## 2023-07-24 ENCOUNTER — APPOINTMENT (OUTPATIENT)
Dept: PULMONOLOGY | Facility: CLINIC | Age: 73
End: 2023-07-24
Payer: MEDICARE

## 2023-07-24 PROCEDURE — 99441: CPT | Mod: 95

## 2023-09-18 ENCOUNTER — EMERGENCY (EMERGENCY)
Facility: HOSPITAL | Age: 73
LOS: 1 days | Discharge: HOME | End: 2023-09-18
Attending: EMERGENCY MEDICINE | Admitting: EMERGENCY MEDICINE
Payer: MEDICARE

## 2023-09-18 VITALS
TEMPERATURE: 98 F | SYSTOLIC BLOOD PRESSURE: 130 MMHG | HEART RATE: 69 BPM | RESPIRATION RATE: 20 BRPM | OXYGEN SATURATION: 98 % | DIASTOLIC BLOOD PRESSURE: 66 MMHG

## 2023-09-18 DIAGNOSIS — Z90.49 ACQUIRED ABSENCE OF OTHER SPECIFIED PARTS OF DIGESTIVE TRACT: Chronic | ICD-10-CM

## 2023-09-18 PROCEDURE — 99284 EMERGENCY DEPT VISIT MOD MDM: CPT

## 2023-09-18 PROCEDURE — 99284 EMERGENCY DEPT VISIT MOD MDM: CPT | Mod: FS

## 2023-09-18 PROCEDURE — 73110 X-RAY EXAM OF WRIST: CPT | Mod: 26,RT

## 2023-09-18 PROCEDURE — 73130 X-RAY EXAM OF HAND: CPT | Mod: 26,RT

## 2023-09-18 PROCEDURE — 73110 X-RAY EXAM OF WRIST: CPT

## 2023-09-18 PROCEDURE — 73130 X-RAY EXAM OF HAND: CPT

## 2023-09-18 RX ORDER — IBUPROFEN 200 MG
600 TABLET ORAL ONCE
Refills: 0 | Status: DISCONTINUED | OUTPATIENT
Start: 2023-09-18 | End: 2023-09-22

## 2023-09-18 NOTE — ED ADULT NURSE NOTE - OBJECTIVE STATEMENT
Pt is alert, came to the ER due to pain and swelling of the right wrist after she fell as she is going down the stairs by missing the last step. No nausea or vomiting. No pain at rest. Ice pack applied.

## 2023-09-18 NOTE — ED PROVIDER NOTE - CLINICAL SUMMARY MEDICAL DECISION MAKING FREE TEXT BOX
71 yo F pw R wrist pain/swelling s/p FOOSH from last step of ladder. No head trauma. Decreased ROM +TTP. Will xray r/o fx Cruz: 71 yo F pw R wrist pain/swelling s/p FOOSH from last step of ladder. No head trauma. Decreased ROM +TTP. Will xray r/o fx

## 2023-09-18 NOTE — ED PROVIDER NOTE - UPPER EXTREMITY EXAM, RIGHT
R distal wrist with ttp to distal radius and ulna. + swelling. Mild ecchymosis. FROM fingers. Normal elbow/shoulder exam./BRUISING/LIMITED ROM/SWELLING/TENDERNESS

## 2023-09-18 NOTE — ED ADULT TRIAGE NOTE - ARRIVAL INFO ADDITIONAL COMMENTS
Patient BIB self from home after fall off bottom rung of 6 foot ladder. Right wrist pain  noted without deformity. Denies surgeries. Denies head strike.

## 2023-09-18 NOTE — ED PROVIDER NOTE - PATIENT PORTAL LINK FT
You can access the FollowMyHealth Patient Portal offered by [facility] by registering at the following website: [email or web address]. By joining [employer]’s FollowMyHealth portal, you will also be able to view your health information using other applications (apps) compatible with our system.

## 2023-09-18 NOTE — ED PROVIDER NOTE - CARE PROVIDER_API CALL
Lucien Pate  Orthopaedic Surgery  [address], [address]  [place] [address]  Phone: [phone]  Fax: [phone]  Follow Up Time:

## 2023-09-18 NOTE — ED ADULT NURSE REASSESSMENT NOTE - NS ED NURSE REASSESS COMMENT FT1
Pt had generalized seizure with daughter at the bedside. Went unresponsive, Blood sugar done stat 123. Dr Cruz called Stat to the bedside. Respiratory paged stat. IV inserted to the right AC ga. 20. Placed on her side and O2 via V mask applied. Moved to Trauma Room for close monitoring.

## 2023-09-18 NOTE — ED ADULT NURSE NOTE - NSFALLUNIVINTERV_ED_ALL_ED
Bed/Stretcher in lowest position, wheels locked, appropriate side rails in place/Call bell, personal items and telephone in reach/Instruct patient to call for assistance before getting out of bed/chair/stretcher/Non-slip footwear applied when patient is off stretcher/Gamaliel to call system/Physically safe environment - no spills, clutter or unnecessary equipment/Purposeful proactive rounding/Room/bathroom lighting operational, light cord in reach

## 2023-09-18 NOTE — ED PROVIDER NOTE - OBJECTIVE STATEMENT
72 year old female, PMHx of htn, lupus, presents to the ED complaining of R wrist injury s/p FOOSH from ladder. Patient states she forgot there was another step to the ladder and fell backwards onto her right outstretched hand. She applied some ice but notes increasing pain and swelling, worse with movement. She denies head/neck trauma or injury. No LOC. No other complaints. Pt is right hand dominant.

## 2023-09-18 NOTE — ED PROVIDER NOTE - CARE PROVIDERS DIRECT ADDRESSES
Viral Upper Respiratory Illness (Adult)  You have a viral upper respiratory illness (URI), which is another term for the common cold. This illness is contagious during the first few days. It is spread through the air by coughing and sneezing. It may also be spread by direct contact (touching the sick person and then touching your own eyes, nose, or mouth). Frequent handwashing will decrease risk of spread. Most viral illnesses go away within 7 to 10 days with rest and simple home remedies. Sometimes the illness may last for several weeks. Antibiotics will not kill a virus, and they are generally not prescribed for this condition.    Home care  · If symptoms are severe, rest at home for the first 2 to 3 days. When you resume activity, don't let yourself get too tired.  · Avoid being exposed to cigarette smoke (yours or others’).  · You may use acetaminophen or ibuprofen to control pain and fever, unless another medicine was prescribed. (Note: If you have chronic liver or kidney disease, have ever had a stomach ulcer or gastrointestinal bleeding, or are taking blood-thinning medicines, talk with your healthcare provider before using these medicines.) Aspirin should never be given to anyone under 18 years of age who is ill with a viral infection or fever. It may cause severe liver or brain damage.  · Your appetite may be poor, so a light diet is fine. Avoid dehydration by drinking 6 to 8 glasses of fluids per day (water, soft drinks, juices, tea, or soup). Extra fluids will help loosen secretions in the nose and lungs.  · Over-the-counter cold medicines will not shorten the length of time you’re sick, but they may be helpful for the following symptoms: cough, sore throat, and nasal and sinus congestion. (Note: Do not use decongestants if you have high blood pressure.)  Follow-up care  Follow up with your healthcare provider, or as advised.  When to seek medical advice  Call your healthcare provider right away if any  of these occur:  · Cough with lots of colored sputum (mucus)  · Severe headache; face, neck, or ear pain  · Difficulty swallowing due to throat pain  · Fever of 100.4°F (38°C)  Call 911, or get immediate medical care  Call emergency services right away if any of these occur:  · Chest pain, shortness of breath, wheezing, or difficulty breathing  · Coughing up blood  · Inability to swallow due to throat pain  © 0791-9044 [employer] [address] [place] [address], [place] [address]. All rights reserved. This information is not intended as a substitute for professional medical care. Always follow your healthcare professional's instructions.         [email or web address]

## 2023-09-18 NOTE — ED PROVIDER NOTE - NSFOLLOWUPINSTRUCTIONS_ED_ALL_ED_FT
-- You should update your primary care physician on your Emergency Department visit and follow up with them.  If you do not have a physician or have difficulty following up, please call: [phone] to obtain a [facility] doctor or specialist who can provide follow up.    -- Take ibuprofen 400 mg every 6 hours with food, as needed for pain    -- Take tylenol 650 mg every 4 hours, as needed for pain    -- Return to the ER for worsening or persistent symptoms, and/or ANY NEW OR CONCERNING SYMPTOMS.

## 2023-09-20 NOTE — CHART NOTE - NSCHARTNOTEFT_GEN_A_CORE
SW placed call to patient to discuss and assist with follow up care.  Patient presented to ED on 9/18/23 due to wrist pain.  SW unable to reach patient, left VM requesting call back for assistance with orthopedist.

## 2023-09-26 ENCOUNTER — APPOINTMENT (OUTPATIENT)
Dept: ORTHOPEDIC SURGERY | Facility: CLINIC | Age: 73
End: 2023-09-26
Payer: MEDICARE

## 2023-09-26 VITALS — HEIGHT: 63 IN | BODY MASS INDEX: 31.36 KG/M2 | WEIGHT: 177 LBS

## 2023-09-26 DIAGNOSIS — M76.892 OTHER SPECIFIED ENTHESOPATHIES OF LEFT LOWER LIMB, EXCLUDING FOOT: ICD-10-CM

## 2023-09-26 DIAGNOSIS — M54.50 LOW BACK PAIN, UNSPECIFIED: ICD-10-CM

## 2023-09-26 PROCEDURE — 72100 X-RAY EXAM L-S SPINE 2/3 VWS: CPT

## 2023-09-26 PROCEDURE — 99214 OFFICE O/P EST MOD 30 MIN: CPT

## 2023-09-26 PROCEDURE — 72170 X-RAY EXAM OF PELVIS: CPT

## 2023-11-03 ENCOUNTER — APPOINTMENT (OUTPATIENT)
Dept: INTERNAL MEDICINE | Facility: CLINIC | Age: 73
End: 2023-11-03
Payer: MEDICARE

## 2023-11-03 DIAGNOSIS — U07.1 COVID-19: ICD-10-CM

## 2023-11-03 PROCEDURE — 99213 OFFICE O/P EST LOW 20 MIN: CPT | Mod: 95

## 2023-11-26 LAB — HEMOCCULT STL QL IA: NEGATIVE

## 2024-01-03 ENCOUNTER — APPOINTMENT (OUTPATIENT)
Dept: MAMMOGRAPHY | Facility: HOSPITAL | Age: 74
End: 2024-01-03
Payer: MEDICARE

## 2024-01-03 ENCOUNTER — RESULT REVIEW (OUTPATIENT)
Age: 74
End: 2024-01-03

## 2024-01-03 ENCOUNTER — OUTPATIENT (OUTPATIENT)
Dept: OUTPATIENT SERVICES | Facility: HOSPITAL | Age: 74
LOS: 1 days | End: 2024-01-03
Payer: MEDICARE

## 2024-01-03 ENCOUNTER — APPOINTMENT (OUTPATIENT)
Dept: ULTRASOUND IMAGING | Facility: HOSPITAL | Age: 74
End: 2024-01-03
Payer: MEDICARE

## 2024-01-03 DIAGNOSIS — Z90.49 ACQUIRED ABSENCE OF OTHER SPECIFIED PARTS OF DIGESTIVE TRACT: Chronic | ICD-10-CM

## 2024-01-03 DIAGNOSIS — R92.30 DENSE BREASTS, UNSPECIFIED: ICD-10-CM

## 2024-01-03 PROCEDURE — 77067 SCR MAMMO BI INCL CAD: CPT | Mod: 26

## 2024-01-03 PROCEDURE — 76641 ULTRASOUND BREAST COMPLETE: CPT | Mod: 26,50,GY

## 2024-01-03 PROCEDURE — 77067 SCR MAMMO BI INCL CAD: CPT

## 2024-01-03 PROCEDURE — 76641 ULTRASOUND BREAST COMPLETE: CPT

## 2024-01-03 PROCEDURE — 77063 BREAST TOMOSYNTHESIS BI: CPT | Mod: 26

## 2024-01-03 PROCEDURE — 77063 BREAST TOMOSYNTHESIS BI: CPT

## 2024-02-15 ENCOUNTER — RX RENEWAL (OUTPATIENT)
Age: 74
End: 2024-02-15

## 2024-02-28 ENCOUNTER — NON-APPOINTMENT (OUTPATIENT)
Age: 74
End: 2024-02-28

## 2024-03-02 ENCOUNTER — APPOINTMENT (OUTPATIENT)
Dept: INTERNAL MEDICINE | Facility: CLINIC | Age: 74
End: 2024-03-02
Payer: MEDICARE

## 2024-03-02 VITALS — SYSTOLIC BLOOD PRESSURE: 138 MMHG | DIASTOLIC BLOOD PRESSURE: 78 MMHG

## 2024-03-02 VITALS
DIASTOLIC BLOOD PRESSURE: 80 MMHG | BODY MASS INDEX: 31.18 KG/M2 | TEMPERATURE: 98.5 F | HEART RATE: 66 BPM | OXYGEN SATURATION: 98 % | SYSTOLIC BLOOD PRESSURE: 158 MMHG | WEIGHT: 176 LBS | HEIGHT: 63 IN | RESPIRATION RATE: 14 BRPM

## 2024-03-02 DIAGNOSIS — Z01.818 ENCOUNTER FOR OTHER PREPROCEDURAL EXAMINATION: ICD-10-CM

## 2024-03-02 PROCEDURE — 99214 OFFICE O/P EST MOD 30 MIN: CPT

## 2024-03-02 NOTE — HISTORY OF PRESENT ILLNESS
[No Pertinent Cardiac History] : no history of aortic stenosis, atrial fibrillation, coronary artery disease, recent myocardial infarction, or implantable device/pacemaker [Asthma] : no asthma [COPD] : no COPD [Sleep Apnea] : sleep apnea [Smoker] : not a smoker [No Adverse Anesthesia Reaction] : no adverse anesthesia reaction in self or family member [FreeTextEntry2] : March 14,2024 [FreeTextEntry3] : Dr. Cotto [FreeTextEntry1] : right carpal tunnel release [FreeTextEntry4] : History of mild sleep apnea- using dental device had full cardiac work up- at [facility] in July.  had stress and echo Dr. Eubanks

## 2024-03-02 NOTE — ASSESSMENT
[Patient Optimized for Surgery] : Patient optimized for surgery [FreeTextEntry4] : Medically cleared for proposed procedure. Am of procedure take losartan with sips of water.  [No Further Testing Recommended] : no further testing recommended

## 2024-03-05 ENCOUNTER — APPOINTMENT (OUTPATIENT)
Dept: INTERNAL MEDICINE | Facility: CLINIC | Age: 74
End: 2024-03-05

## 2024-05-20 ENCOUNTER — RX RENEWAL (OUTPATIENT)
Age: 74
End: 2024-05-20

## 2024-05-29 ENCOUNTER — RX RENEWAL (OUTPATIENT)
Age: 74
End: 2024-05-29

## 2024-05-29 RX ORDER — LOSARTAN POTASSIUM 100 MG/1
100 TABLET, FILM COATED ORAL
Qty: 90 | Refills: 0 | Status: ACTIVE | COMMUNITY
Start: 2023-02-23 | End: 1900-01-01

## 2024-07-24 ENCOUNTER — NON-APPOINTMENT (OUTPATIENT)
Age: 74
End: 2024-07-24

## 2024-07-24 DIAGNOSIS — L82.1 OTHER SEBORRHEIC KERATOSIS: ICD-10-CM

## 2024-07-24 DIAGNOSIS — D22.9 MELANOCYTIC NEVI, UNSPECIFIED: ICD-10-CM

## 2024-07-24 DIAGNOSIS — L29.9 PRURITUS, UNSPECIFIED: ICD-10-CM

## 2024-07-24 DIAGNOSIS — S62.101A FRACTURE OF UNSPECIFIED CARPAL BONE, RIGHT WRIST, INITIAL ENCOUNTER FOR CLOSED FRACTURE: ICD-10-CM

## 2024-07-24 DIAGNOSIS — Z86.19 PERSONAL HISTORY OF OTHER INFECTIOUS AND PARASITIC DISEASES: ICD-10-CM

## 2024-07-24 DIAGNOSIS — L93.2 OTHER LOCAL LUPUS ERYTHEMATOSUS: ICD-10-CM

## 2024-07-24 DIAGNOSIS — Z84.0 FAMILY HISTORY OF DISEASES OF THE SKIN AND SUBCUTANEOUS TISSUE: ICD-10-CM

## 2024-07-24 RX ORDER — HYDROXYCHLOROQUINE SULFATE 100 MG/1
100 TABLET ORAL
Refills: 0 | Status: ACTIVE | COMMUNITY

## 2024-08-13 ENCOUNTER — APPOINTMENT (OUTPATIENT)
Dept: DERMATOLOGY | Facility: CLINIC | Age: 74
End: 2024-08-13

## 2024-10-29 ENCOUNTER — APPOINTMENT (OUTPATIENT)
Dept: INTERNAL MEDICINE | Facility: CLINIC | Age: 74
End: 2024-10-29
Payer: MEDICARE

## 2024-10-29 VITALS
WEIGHT: 182 LBS | SYSTOLIC BLOOD PRESSURE: 120 MMHG | HEART RATE: 78 BPM | TEMPERATURE: 98.2 F | DIASTOLIC BLOOD PRESSURE: 78 MMHG | BODY MASS INDEX: 32.25 KG/M2 | OXYGEN SATURATION: 98 % | HEIGHT: 63 IN | RESPIRATION RATE: 14 BRPM

## 2024-10-29 DIAGNOSIS — Z00.00 ENCOUNTER FOR GENERAL ADULT MEDICAL EXAMINATION W/OUT ABNORMAL FINDINGS: ICD-10-CM

## 2024-10-29 DIAGNOSIS — I10 ESSENTIAL (PRIMARY) HYPERTENSION: ICD-10-CM

## 2024-10-29 DIAGNOSIS — Z23 ENCOUNTER FOR IMMUNIZATION: ICD-10-CM

## 2024-10-29 DIAGNOSIS — L93.2 OTHER LOCAL LUPUS ERYTHEMATOSUS: ICD-10-CM

## 2024-10-29 PROCEDURE — G0439: CPT

## 2024-10-29 PROCEDURE — G0008: CPT

## 2024-10-29 PROCEDURE — 90662 IIV NO PRSV INCREASED AG IM: CPT

## 2024-12-26 ENCOUNTER — APPOINTMENT (OUTPATIENT)
Dept: ORTHOPEDIC SURGERY | Facility: CLINIC | Age: 74
End: 2024-12-26

## 2025-01-17 ENCOUNTER — APPOINTMENT (OUTPATIENT)
Dept: ORTHOPEDIC SURGERY | Facility: CLINIC | Age: 75
End: 2025-01-17

## 2025-01-22 ENCOUNTER — APPOINTMENT (OUTPATIENT)
Dept: ORTHOPEDIC SURGERY | Facility: CLINIC | Age: 75
End: 2025-01-22
Payer: MEDICARE

## 2025-01-22 VITALS — HEIGHT: 63 IN | BODY MASS INDEX: 33.66 KG/M2 | WEIGHT: 190 LBS

## 2025-01-22 DIAGNOSIS — M70.62 TROCHANTERIC BURSITIS, LEFT HIP: ICD-10-CM

## 2025-01-22 PROCEDURE — 73503 X-RAY EXAM HIP UNI 4/> VIEWS: CPT | Mod: LT

## 2025-01-22 PROCEDURE — 99214 OFFICE O/P EST MOD 30 MIN: CPT

## 2025-01-22 RX ORDER — MELOXICAM 15 MG/1
15 TABLET ORAL
Qty: 45 | Refills: 1 | Status: ACTIVE | COMMUNITY
Start: 2025-01-22 | End: 1900-01-01

## 2025-02-14 ENCOUNTER — RX RENEWAL (OUTPATIENT)
Age: 75
End: 2025-02-14

## 2025-03-04 ENCOUNTER — APPOINTMENT (OUTPATIENT)
Dept: INTERNAL MEDICINE | Facility: CLINIC | Age: 75
End: 2025-03-04
Payer: MEDICARE

## 2025-03-04 VITALS
HEIGHT: 63 IN | BODY MASS INDEX: 33.31 KG/M2 | SYSTOLIC BLOOD PRESSURE: 150 MMHG | HEART RATE: 77 BPM | RESPIRATION RATE: 14 BRPM | OXYGEN SATURATION: 98 % | DIASTOLIC BLOOD PRESSURE: 78 MMHG | TEMPERATURE: 98 F | WEIGHT: 188 LBS

## 2025-03-04 VITALS — DIASTOLIC BLOOD PRESSURE: 82 MMHG | SYSTOLIC BLOOD PRESSURE: 132 MMHG

## 2025-03-04 DIAGNOSIS — I10 ESSENTIAL (PRIMARY) HYPERTENSION: ICD-10-CM

## 2025-03-04 DIAGNOSIS — R92.30 DENSE BREASTS, UNSPECIFIED: ICD-10-CM

## 2025-03-04 PROCEDURE — G2211 COMPLEX E/M VISIT ADD ON: CPT

## 2025-03-04 PROCEDURE — 99214 OFFICE O/P EST MOD 30 MIN: CPT

## 2025-03-07 ENCOUNTER — TRANSCRIPTION ENCOUNTER (OUTPATIENT)
Age: 75
End: 2025-03-07

## 2025-03-12 ENCOUNTER — RESULT REVIEW (OUTPATIENT)
Age: 75
End: 2025-03-12

## 2025-03-12 ENCOUNTER — OUTPATIENT (OUTPATIENT)
Dept: OUTPATIENT SERVICES | Facility: HOSPITAL | Age: 75
LOS: 1 days | End: 2025-03-12
Payer: MEDICARE

## 2025-03-12 ENCOUNTER — APPOINTMENT (OUTPATIENT)
Dept: MAMMOGRAPHY | Facility: HOSPITAL | Age: 75
End: 2025-03-12
Payer: MEDICARE

## 2025-03-12 ENCOUNTER — APPOINTMENT (OUTPATIENT)
Dept: ULTRASOUND IMAGING | Facility: HOSPITAL | Age: 75
End: 2025-03-12
Payer: MEDICARE

## 2025-03-12 DIAGNOSIS — Z90.49 ACQUIRED ABSENCE OF OTHER SPECIFIED PARTS OF DIGESTIVE TRACT: Chronic | ICD-10-CM

## 2025-03-12 DIAGNOSIS — R92.30 DENSE BREASTS, UNSPECIFIED: ICD-10-CM

## 2025-03-12 PROCEDURE — 77063 BREAST TOMOSYNTHESIS BI: CPT | Mod: 26

## 2025-03-12 PROCEDURE — 76641 ULTRASOUND BREAST COMPLETE: CPT | Mod: 26,50,GA

## 2025-03-12 PROCEDURE — 77067 SCR MAMMO BI INCL CAD: CPT | Mod: 26

## 2025-03-12 PROCEDURE — 77067 SCR MAMMO BI INCL CAD: CPT

## 2025-03-12 PROCEDURE — 77063 BREAST TOMOSYNTHESIS BI: CPT

## 2025-03-12 PROCEDURE — 76641 ULTRASOUND BREAST COMPLETE: CPT

## 2025-05-13 ENCOUNTER — TRANSCRIPTION ENCOUNTER (OUTPATIENT)
Age: 75
End: 2025-05-13

## 2025-06-02 ENCOUNTER — RX RENEWAL (OUTPATIENT)
Age: 75
End: 2025-06-02

## 2025-07-07 ENCOUNTER — NON-APPOINTMENT (OUTPATIENT)
Age: 75
End: 2025-07-07

## 2025-07-08 ENCOUNTER — APPOINTMENT (OUTPATIENT)
Dept: INTERNAL MEDICINE | Facility: CLINIC | Age: 75
End: 2025-07-08
Payer: MEDICARE

## 2025-07-08 VITALS
WEIGHT: 185 LBS | HEART RATE: 76 BPM | TEMPERATURE: 98.4 F | DIASTOLIC BLOOD PRESSURE: 78 MMHG | RESPIRATION RATE: 16 BRPM | OXYGEN SATURATION: 97 % | BODY MASS INDEX: 32.78 KG/M2 | SYSTOLIC BLOOD PRESSURE: 128 MMHG | HEIGHT: 63 IN

## 2025-07-08 PROCEDURE — G2211 COMPLEX E/M VISIT ADD ON: CPT

## 2025-07-08 PROCEDURE — 99213 OFFICE O/P EST LOW 20 MIN: CPT

## 2025-09-20 ENCOUNTER — NON-APPOINTMENT (OUTPATIENT)
Age: 75
End: 2025-09-20